# Patient Record
Sex: MALE | Race: WHITE | Employment: FULL TIME | URBAN - METROPOLITAN AREA
[De-identification: names, ages, dates, MRNs, and addresses within clinical notes are randomized per-mention and may not be internally consistent; named-entity substitution may affect disease eponyms.]

---

## 2017-01-31 PROBLEM — Z85.828 PERSONAL HISTORY OF OTHER MALIGNANT NEOPLASM OF SKIN: Status: ACTIVE | Noted: 2017-01-31

## 2018-11-08 ENCOUNTER — TELEPHONE (OUTPATIENT)
Dept: FAMILY MEDICINE CLINIC | Facility: CLINIC | Age: 63
End: 2018-11-08

## 2018-11-08 DIAGNOSIS — Z00.00 LABORATORY EXAM ORDERED AS PART OF ROUTINE GENERAL MEDICAL EXAMINATION: Primary | ICD-10-CM

## 2018-11-08 NOTE — TELEPHONE ENCOUNTER
Called patent and he stated he is in real good health and is looking for getting a physical done to see what his state of health is now to decide if he should retire now or wait to get his social security later.  I explained what a physical entails and that

## 2018-11-08 NOTE — TELEPHONE ENCOUNTER
Pt has questions on a Complete Physical Exam. States he want a Full Comprehensive physical with Stress test and all. Tried explaining to him that he can request once he goes over with the Physician.  Still has questions

## 2018-11-08 NOTE — TELEPHONE ENCOUNTER
Please enter lab orders for the patient's upcoming physical appointment. Physical scheduled:    Your appointments     Date & Time Appointment Department [facility])    Nov 20, 2018 11:30 AM CST Physical - Established Patient with MD Jing Sanchez

## 2018-11-13 ENCOUNTER — APPOINTMENT (OUTPATIENT)
Dept: LAB | Age: 63
End: 2018-11-13
Attending: FAMILY MEDICINE
Payer: COMMERCIAL

## 2018-11-13 DIAGNOSIS — Z00.00 LABORATORY EXAM ORDERED AS PART OF ROUTINE GENERAL MEDICAL EXAMINATION: ICD-10-CM

## 2018-11-13 PROCEDURE — 80061 LIPID PANEL: CPT

## 2018-11-13 PROCEDURE — 80053 COMPREHEN METABOLIC PANEL: CPT

## 2018-11-13 PROCEDURE — 36415 COLL VENOUS BLD VENIPUNCTURE: CPT

## 2018-11-20 ENCOUNTER — OFFICE VISIT (OUTPATIENT)
Dept: FAMILY MEDICINE CLINIC | Facility: CLINIC | Age: 63
End: 2018-11-20
Payer: COMMERCIAL

## 2018-11-20 VITALS
TEMPERATURE: 98 F | SYSTOLIC BLOOD PRESSURE: 112 MMHG | DIASTOLIC BLOOD PRESSURE: 78 MMHG | BODY MASS INDEX: 25.34 KG/M2 | HEIGHT: 71 IN | WEIGHT: 181 LBS | HEART RATE: 60 BPM

## 2018-11-20 DIAGNOSIS — E78.00 ELEVATED LDL CHOLESTEROL LEVEL: ICD-10-CM

## 2018-11-20 DIAGNOSIS — Z12.11 COLON CANCER SCREENING: ICD-10-CM

## 2018-11-20 DIAGNOSIS — Z00.00 ANNUAL PHYSICAL EXAM: Primary | ICD-10-CM

## 2018-11-20 PROCEDURE — 99397 PER PM REEVAL EST PAT 65+ YR: CPT | Performed by: FAMILY MEDICINE

## 2018-11-20 NOTE — PROGRESS NOTES
Patient presents with:  Physical     HPI:   Jen Simpson is a 70year old male who presents for a complete physical exam. Feels he is in pretty good health overall. Cholesterol up just a bit from 3 yrs ago, LDL now 153. No red meat.   Eats a fair am 3 - all grown, in 35s. .   Exercise: daily  Diet: healthy options. REVIEW OF SYSTEMS:     All systems reviewed, negative other than noted above.     EXAM:   /78   Pulse 60   Temp 98.3 °F (36.8 °C) (Oral)   Ht 71\"   Wt 181 lb   BMI 25.24 kg/m²   B 70yo.  Will track year to year. 3. Colon cancer screening  Overdue. Stool testing ordered  - OCCULT BLOOD, FECAL, IMMUNOASSAY;  Future      Perla Fernandez M.D.   [facility]  11/20/18    Return in about 1 year (around 11/20/2019) for Lulú Monzon

## 2020-06-11 ENCOUNTER — TELEPHONE (OUTPATIENT)
Dept: FAMILY MEDICINE CLINIC | Facility: CLINIC | Age: 65
End: 2020-06-11

## 2020-06-11 NOTE — TELEPHONE ENCOUNTER
Yesterday fell and hit hand had some pain but he iced it and it is getting better can move all his digits. I asked him to continue icing the hand and if not better tomorrow to call back for possible x-ray.  Or video visit

## 2020-06-12 ENCOUNTER — HOSPITAL ENCOUNTER (OUTPATIENT)
Dept: GENERAL RADIOLOGY | Facility: HOSPITAL | Age: 65
Discharge: HOME | End: 2020-06-12
Attending: FAMILY MEDICINE
Payer: COMMERCIAL

## 2020-06-12 ENCOUNTER — OFFICE VISIT (OUTPATIENT)
Dept: FAMILY MEDICINE CLINIC | Facility: CLINIC | Age: 65
End: 2020-06-12
Payer: COMMERCIAL

## 2020-06-12 VITALS
BODY MASS INDEX: 24.24 KG/M2 | WEIGHT: 179 LBS | DIASTOLIC BLOOD PRESSURE: 90 MMHG | TEMPERATURE: 97 F | RESPIRATION RATE: 12 BRPM | HEART RATE: 56 BPM | HEIGHT: 72 IN | SYSTOLIC BLOOD PRESSURE: 142 MMHG
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
## 2020-06-12 DIAGNOSIS — M25.532 LEFT WRIST PAIN: ICD-10-CM

## 2020-06-12 PROCEDURE — 99214 OFFICE O/P EST MOD 30 MIN: CPT | Performed by: FAMILY MEDICINE

## 2020-06-12 PROCEDURE — 73110 X-RAY EXAM OF WRIST: CPT | Performed by: FAMILY MEDICINE

## 2020-06-12 NOTE — PROGRESS NOTES
Patient presents with:  Swelling: pt fell 2 days on left hand       Subjective   HPI:   This is a 77year old male coming in for fall on rock, slipped and landed on right wrists. Swollen and painfull and mildly red. Swelling   This is a new problem.  Diego Overcast heard.  Pulmonary/Chest: Effort normal and breath sounds normal. No respiratory distress. Abdominal: Soft. Normal appearance and bowel sounds are normal. There is no tenderness. Neurological: He is alert and oriented to person, place, and time.    Skin: pain not better in 3-5 says or not much better in 2 wekes. Repeat xray . No snuff box tenderness so no risk of navicular fx. Will follow. Return in about 2 weeks (around 6/26/2020).     Rosalva Muhammad MD, 6/12/2020, 1:13 PM

## 2020-06-12 NOTE — TELEPHONE ENCOUNTER
Pt calling back and states he still having some swelling and tenderness. A video visit was offered but pt states he can't do.  Requesting to speak to a nurse again

## 2020-06-12 NOTE — TELEPHONE ENCOUNTER
Future Appointments   Date Time Provider Francesca Radha   6/12/2020  1:00 PM Santino Lanier MD [facility]

## 2021-03-15 DIAGNOSIS — Z23 NEED FOR VACCINATION: ICD-10-CM
